# Patient Record
Sex: MALE | Race: WHITE | NOT HISPANIC OR LATINO | Employment: FULL TIME | URBAN - METROPOLITAN AREA
[De-identification: names, ages, dates, MRNs, and addresses within clinical notes are randomized per-mention and may not be internally consistent; named-entity substitution may affect disease eponyms.]

---

## 2019-12-05 ENCOUNTER — OFFICE VISIT (OUTPATIENT)
Dept: SURGERY | Facility: CLINIC | Age: 39
End: 2019-12-05

## 2019-12-05 VITALS — HEART RATE: 67 BPM | HEIGHT: 72 IN | WEIGHT: 198.2 LBS | BODY MASS INDEX: 26.84 KG/M2 | OXYGEN SATURATION: 98 %

## 2019-12-05 DIAGNOSIS — L05.91 PILONIDAL CYST: Primary | ICD-10-CM

## 2019-12-05 PROCEDURE — 99202 OFFICE O/P NEW SF 15 MIN: CPT | Performed by: SURGERY

## 2019-12-05 RX ORDER — FINASTERIDE 5 MG/1
1 TABLET, FILM COATED ORAL
Refills: 3 | COMMUNITY
Start: 2019-11-20

## 2019-12-20 NOTE — PROGRESS NOTES
Subjective   Derek Chong is a 39 y.o. male who presents to the office for a pilonidal cyst.    History of Present Illness     The patient has a lesion at the superior margin of his gluteal fold that intermittently drains.  He has gotten larger in the past and then spontaneously decompressed.  Recently he is just noticed intermittent drainage.  There is no active pain at this time.    Review of Systems   Constitutional: Negative for fatigue and fever.   Respiratory: Negative for chest tightness and shortness of breath.    Cardiovascular: Negative for chest pain and palpitations.   Gastrointestinal: Negative for abdominal pain, blood in stool, constipation, diarrhea, nausea and vomiting.     Past Medical History:   Diagnosis Date   • Irregular heartbeat      Past Surgical History:   Procedure Laterality Date   • MYRINGOTOMY Bilateral 1984     Family History   Problem Relation Age of Onset   • Lymphoma Mother      Social History     Socioeconomic History   • Marital status:      Spouse name: Not on file   • Number of children: Not on file   • Years of education: Not on file   • Highest education level: Not on file   Occupational History   • Occupation: Physician   Tobacco Use   • Smoking status: Never Smoker   • Smokeless tobacco: Never Used   Substance and Sexual Activity   • Alcohol use: Yes     Frequency: 2-3 times a week   • Sexual activity: Defer       Objective   Physical Exam   Constitutional: He is oriented to person, place, and time. He appears well-developed and well-nourished. He is cooperative.  Non-toxic appearance.   Eyes: EOM are normal. No scleral icterus.   Pulmonary/Chest: Effort normal. No respiratory distress.   Neurological: He is alert and oriented to person, place, and time.   Skin: Skin is warm and dry.   There is a small pilonidal cyst at the superior margin of his gluteal fold with at least one sinus tract.  There is no evidence of infection.   Psychiatric: He has a normal mood and  affect. His behavior is normal. Judgment and thought content normal.       Assessment/Plan       The encounter diagnosis was Pilonidal cyst.    The patient has a chronic pilonidal cyst with no evidence of infection.  He was offered an excision of the pilonidal cyst and the postop expectations were discussed including the likely need for wound care.  He is not ready to proceed with surgery at this time.  He will contact our office if his symptoms worsen or if he decides to proceed with an excision of the pilonidal cyst.